# Patient Record
Sex: FEMALE | Race: BLACK OR AFRICAN AMERICAN
[De-identification: names, ages, dates, MRNs, and addresses within clinical notes are randomized per-mention and may not be internally consistent; named-entity substitution may affect disease eponyms.]

---

## 2021-03-29 ENCOUNTER — NURSE TRIAGE (OUTPATIENT)
Dept: OTHER | Facility: CLINIC | Age: 86
End: 2021-03-29

## 2021-03-29 NOTE — TELEPHONE ENCOUNTER
Brief description of triage: middle of neck to right ear to base of head throbbing pain starting about an hour before call. Had second vaccine 3 weeks ago and concerned this might be related. She is not a member of [employer]. Her sister is and gave her this line to call. Triage indicates for patient to PCP today. She will call PCP after our call. Care advice provided, patient verbalizes understanding; denies any other questions or concerns; instructed to call back for any new or worsening symptoms. This triage is a result of a call to [address] [place] [address]. Please do not respond to the triage nurse through this encounter. Any subsequent communication should be directly with the patient. Reason for Disposition   Patient wants to be seen     Triaged up d/t atypical presentation and history of HTN. Answer Assessment - Initial Assessment Questions  1. ONSET: \"When did the pain begin? \"       Pain started about an hour prior to call    2. LOCATION: \"Where does it hurt? \"       See above    3. PATTERN \"Does the pain come and go, or has it been constant since it started? \"       Constant    4. SEVERITY: \"How bad is the pain? \"  (Scale 1-10; or mild, moderate, severe)    - MILD (1-3): doesn't interfere with normal activities     - MODERATE (4-7): interferes with normal activities or awakens from sleep     - SEVERE (8-10):  excruciating pain, unable to do any normal activities       5-6/10    5. RADIATION: \"Does the pain go anywhere else, shoot into your arms? \"      See above    6. CORD SYMPTOMS: \"Any weakness or numbness of the arms or legs? \"      Denies    7. CAUSE: \"What do you think is causing the neck pain? \"      Unsure    8. NECK OVERUSE: [place] recent activities that involved turning or twisting the neck? \"      Denies    9. OTHER SYMPTOMS: \"Do you have any other symptoms? \" (e.g., headache, fever, chest pain, difficulty breathing, neck swelling)      Dizziness comes and goes and pressure in nasal passages. Goes away with flonase. She has not had this neck pain before. She has history of HTN. 10. PREGNANCY: \"Is there any chance you are pregnant? \" \"When was your last menstrual period? \"        NA    Protocols used: NECK PAIN OR STIFFNESS-ADULT-[place]

## 2023-10-06 PROBLEM — J31.0 CHRONIC RHINITIS: Status: ACTIVE | Noted: 2023-10-06

## 2023-10-06 PROBLEM — K22.0 ACHALASIA OF ESOPHAGUS: Status: ACTIVE | Noted: 2023-10-06

## 2023-10-06 PROBLEM — I10 HTN (HYPERTENSION), BENIGN: Status: ACTIVE | Noted: 2023-10-06

## 2023-10-06 PROBLEM — I65.23 BILATERAL CAROTID ARTERY STENOSIS: Status: ACTIVE | Noted: 2023-10-06

## 2023-10-06 PROBLEM — R55 PRE-SYNCOPE: Status: ACTIVE | Noted: 2023-10-06

## 2023-10-06 PROBLEM — R00.1 BRADYCARDIA: Status: ACTIVE | Noted: 2023-10-06

## 2023-10-06 PROBLEM — H83.3X1 ACOUSTIC TRAUMA OF RIGHT EAR: Status: ACTIVE | Noted: 2023-10-06

## 2023-10-06 PROBLEM — H61.21 EXCESSIVE CERUMEN IN EAR CANAL, RIGHT: Status: ACTIVE | Noted: 2023-10-06

## 2023-10-06 PROBLEM — R06.02 SHORTNESS OF BREATH AT REST: Status: ACTIVE | Noted: 2023-10-06

## 2023-10-06 PROBLEM — Z95.0 PACEMAKER: Status: ACTIVE | Noted: 2023-10-06

## 2023-10-06 RX ORDER — NICOTINE POLACRILEX 2 MG
GUM BUCCAL
COMMUNITY

## 2023-10-06 RX ORDER — CHOLECALCIFEROL (VITAMIN D3) 50 MCG
2000 TABLET ORAL DAILY
COMMUNITY

## 2023-10-06 RX ORDER — FLUTICASONE PROPIONATE 50 MCG
SPRAY, SUSPENSION (ML) NASAL
COMMUNITY

## 2023-10-06 RX ORDER — CALCIUM CARB/VITAMIN D3/VIT K1 650MG-12.5
1 TABLET,CHEWABLE ORAL DAILY
COMMUNITY
End: 2023-10-10 | Stop reason: ALTCHOICE

## 2023-10-06 RX ORDER — PANTOPRAZOLE SODIUM 40 MG/1
1 TABLET, DELAYED RELEASE ORAL DAILY
COMMUNITY

## 2023-10-06 RX ORDER — AMLODIPINE BESYLATE 2.5 MG/1
2.5 TABLET ORAL DAILY
COMMUNITY

## 2023-10-06 RX ORDER — ESTRADIOL 0.05 MG/D
FILM, EXTENDED RELEASE TRANSDERMAL
COMMUNITY
End: 2023-10-10 | Stop reason: ALTCHOICE

## 2023-10-06 RX ORDER — ATORVASTATIN CALCIUM 10 MG/1
10 TABLET, FILM COATED ORAL DAILY
COMMUNITY
Start: 2023-04-12 | End: 2023-10-10 | Stop reason: ALTCHOICE

## 2023-10-06 RX ORDER — LUBIPROSTONE 24 UG/1
CAPSULE ORAL
COMMUNITY

## 2023-10-06 RX ORDER — DIAZEPAM 5 MG/1
TABLET ORAL
COMMUNITY

## 2023-10-06 RX ORDER — ESTRADIOL 0.1 MG/G
CREAM VAGINAL
COMMUNITY

## 2023-10-06 ASSESSMENT — ENCOUNTER SYMPTOMS
HEADACHES: 1
LIGHT-HEADEDNESS: 1
DIZZINESS: 1

## 2023-10-06 NOTE — PROGRESS NOTES
Subjective   Patient ID: Bijal Vann is a 88 y.o. female who presents for vertigo and sinus.  HPI  This 88-year-old female is being seen back in the office today for a recheck of her ears.  She has had difficulties with balance function and a previous VNG study was found to be negative for peripheral vestibular dysfunction. Her VNG study previously done revealed a 9% weakness to the right peripheral vestibular system which is in still the range of normal. Up to a 21% difference between the 2 years is felt to still be within a normal range. Hallpike maneuvers, head and body positional changes were all negative for stimulated vertigo with no nystagmus being monitored indicating no active peripheral positional vertigo at this time. She did have difficulties with ocular motor and optokinetic testing which is generally felt to be a central nervous system sign.  She has been referred to neurology for further assessment.  She does have a history of achalasia and has been seen by GI.  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  She has been seen by neurology in the past primarily for headaches and facial pain.  Most recently within the past week she developed dizziness when waking up in the morning and this has continued throughout the week.  She was taking meclizine when this was developing which caused her to feel lightheaded and more dizzy.  The medication did not help.  On 1 morning she did wake up with nasal congestion and a headache.  She has had nasal congestion in the past and an endoscopic examination done to assess that was found to be negative for structural abnormalities.  This been no purulent discharge or signs of infection. Previous CT scan of the head and CTA scan from earlier in the year was negative for acute central nervous system problems and the vascularity especially in the posterior fossa was  intact.n.    Review of Systems   HENT:  Positive for hearing loss.    Neurological:  Positive for dizziness, light-headedness and headaches.     Objective   Physical Exam  Examination:    CONSTITUTIONAL: Alert, in no acute distress, normal pitch/clarity of voice, well-developed, well-nourished, cooperative.  HEAD/FACE: Normocephalic, atraumatic, no tenderness over the sinuses, facial strength and movement symmetric.    SKIN: Good turgor, no rashes, no suspicious lesions, in the head and neck.    EYES: Both eyes have normal extraocular movements with no nystagmus, pupils are equal and reactive to light and accommodation, conjunctiva is clear.    EARS: Both ears are negative for external skin abnormalities, external auditory canals are without lesions or signs of inflammation, tympanic membranes are intact and are of normal color and texture, no effusions are seen, light reflexes normal, no mastoid tenderness is noted to palpation, objective hearing is intact.    NOSE: No external skin lesions are noted, nares are patent, septum is intact and noninflamed, nasal turbinates are normal in appearance, sinuses are nontender to palpation bilaterally, no internal lesions or polyps are noted, no discharge is noted.    OROPHARYNX/ORAL CAVITY: Mucous membranes of the oropharynx and the oral cavity proper are without lesions or ulcerations, tongue mobility is normal and no lesions are noted, gingiva and alveolar mucosa is intact without lesions, oral mucosa is moist, muscular movement of the palate and gag reflex are normal.    NECK: No lymphadenopathy is palpated, neck is supple with full range of motion, thyroid is without swelling or tenderness, trachea is midline, no neck masses are noted.    Lymphatics: No cervical adenopathy or supraclavicular adenopathy noted to palpation.    HEART/VASCULAR: No jugular venous distention is noted, carotid pulsations are intact with a regular rate and rhythm noted,    PULMONARY: Good air  movement with normal inspiratory/expiratory effort is noted, no audible wheezing is appreciated.    NEUROLOGIC: Alert and oriented, cranial nerves are grossly intact, gait is normal, test of skew was normal, extraocular motions were intact with no visible nystagmus, head thrust was unable to be done due to neck stiffness, Romberg was normal sensation in the head and neck is intact,    PSYCH: oriented to person, place and time, normal mood and affect.    EXTREMITIES: No motor dysfunction of the upper and lower extremity is noted.     Assessment/Plan   Problem List Items Addressed This Visit             ICD-10-CM    Chronic rhinitis J31.0    Pacemaker Z95.0    Dizziness and giddiness - Primary R42     Other Visit Diagnoses         Codes    Nasal congestion     R09.81          I discussed the clinical finds with the patient.  From the standpoint of her past examination her VNG study was negative for peripheral vestibular problems and when she was having symptoms of dizziness previously she had been advised to see neurology for further assessment.  She does have a pacemaker in place and at the present time does not appear to be having any cardiovascular issues.  Previous imaging studies were negative for acute central nervous system problems.  I did recommend that she continue with the therapy that has been set up for treatment of low back pain and sciatica although I did give her a referral to that therapist for balance assessment and therapy.  Neck assessment should be done by the therapist to see if this is accountable for any of her symptoms.  She is also advised to follow-up with neurology which is scheduled for the near future.  From a nasal standpoint she should use nasal saline misting during the day and I did suggest she try Nasacort as a topical nasal steroid spray to help reduce any mucous membrane swelling.  Previous CT scan of the head and CTA scan was negative for acute central nervous system problems and  the vascularity especially in the posterior fossa was intact.

## 2023-10-09 PROBLEM — R07.9 CHEST PAIN: Status: ACTIVE | Noted: 2022-09-24
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
## 2023-10-09 PROBLEM — R60.0 LOCALIZED EDEMA: Status: ACTIVE | Noted: 2020-11-23

## 2023-10-09 PROBLEM — M19.90 UNSPECIFIED OSTEOARTHRITIS, UNSPECIFIED SITE: Status: ACTIVE | Noted: 2022-12-16

## 2023-10-09 PROBLEM — I45.9 CONDUCTION DISORDER, UNSPECIFIED: Status: ACTIVE | Noted: 2023-01-13

## 2023-10-09 PROBLEM — M19.031 PRIMARY OSTEOARTHRITIS, RIGHT WRIST: Status: ACTIVE | Noted: 2023-05-31

## 2023-10-09 PROBLEM — M54.50 LOW BACK PAIN, UNSPECIFIED: Status: ACTIVE | Noted: 2023-09-07

## 2023-10-09 PROBLEM — E78.00 ELEVATED CHOLESTEROL: Status: ACTIVE | Noted: 2022-09-24

## 2023-10-09 PROBLEM — I65.23 OCCLUSION AND STENOSIS OF BILATERAL CAROTID ARTERIES: Status: ACTIVE | Noted: 2023-10-04

## 2023-10-09 PROBLEM — K22.0 ACHALASIA OF CARDIA: Status: ACTIVE | Noted: 2023-01-13

## 2023-10-09 PROBLEM — K21.9 GERD WITHOUT ESOPHAGITIS: Status: ACTIVE | Noted: 2023-01-13
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
## 2023-10-09 PROBLEM — R68.84 JAW PAIN: Status: ACTIVE | Noted: 2023-03-14

## 2023-10-09 PROBLEM — E87.5 HYPERKALEMIA: Status: ACTIVE | Noted: 2023-01-13

## 2023-10-09 PROBLEM — E87.1 HYPO-OSMOLALITY AND HYPONATREMIA: Status: ACTIVE | Noted: 2023-01-13

## 2023-10-09 PROBLEM — H43.393 VITREOUS SYNERESIS OF BOTH EYES: Status: ACTIVE | Noted: 2021-02-11

## 2023-10-09 PROBLEM — H35.89 MACULAR RPE MOTTLING: Status: ACTIVE | Noted: 2022-09-20

## 2023-10-09 PROBLEM — F41.9 ANXIETY DISORDER, UNSPECIFIED: Status: ACTIVE | Noted: 2023-01-13

## 2023-10-09 PROBLEM — R39.15 URGENCY OF URINATION: Status: ACTIVE | Noted: 2020-02-21

## 2023-10-09 PROBLEM — H53.10 SUBJECTIVE VISUAL DISTURBANCE: Status: ACTIVE | Noted: 2022-09-20

## 2023-10-09 PROBLEM — N95.2 ATROPHIC VAGINITIS: Status: ACTIVE | Noted: 2019-02-14

## 2023-10-09 PROBLEM — I49.5 SICK SINUS SYNDROME (MULTI): Status: ACTIVE | Noted: 2023-06-19

## 2023-10-09 PROBLEM — Z90.710 H/O ABDOMINAL HYSTERECTOMY: Status: ACTIVE | Noted: 2022-03-07

## 2023-10-09 PROBLEM — R20.2 PARESTHESIA OF SKIN: Status: ACTIVE | Noted: 2023-01-13

## 2023-10-09 PROBLEM — H04.123 DRY EYE SYNDROME OF BILATERAL LACRIMAL GLANDS: Status: ACTIVE | Noted: 2017-03-14

## 2023-10-09 PROBLEM — M81.0 AGE-RELATED OSTEOPOROSIS WITHOUT CURRENT PATHOLOGICAL FRACTURE: Status: ACTIVE | Noted: 2019-03-26

## 2023-10-09 PROBLEM — R20.0 ANESTHESIA OF SKIN: Status: ACTIVE | Noted: 2023-01-13

## 2023-10-09 PROBLEM — N95.1 SYMPTOMATIC MENOPAUSAL OR FEMALE CLIMACTERIC STATES: Status: ACTIVE | Noted: 2018-02-13

## 2023-10-09 PROBLEM — R35.1 NOCTURIA: Status: ACTIVE | Noted: 2020-02-21

## 2023-10-09 PROBLEM — Z96.1 PSEUDOPHAKIA OF BOTH EYES: Status: ACTIVE | Noted: 2021-02-11

## 2023-10-09 RX ORDER — MECLIZINE HCL 12.5 MG 12.5 MG/1
TABLET ORAL
COMMUNITY

## 2023-10-09 RX ORDER — HYOSCYAMINE SULFATE 0.125 MG
TABLET ORAL
COMMUNITY
Start: 2019-10-19 | End: 2023-10-10 | Stop reason: ALTCHOICE

## 2023-10-10 ENCOUNTER — OFFICE VISIT (OUTPATIENT)
Dept: OTOLARYNGOLOGY | Facility: CLINIC | Age: 88
End: 2023-10-10
Payer: MEDICARE

## 2023-10-10 VITALS
HEIGHT: 62 IN | DIASTOLIC BLOOD PRESSURE: 74 MMHG | WEIGHT: 102 LBS | HEART RATE: 72 BPM | BODY MASS INDEX: 18.77 KG/M2 | SYSTOLIC BLOOD PRESSURE: 153 MMHG

## 2023-10-10 DIAGNOSIS — Z95.0 PACEMAKER: ICD-10-CM

## 2023-10-10 DIAGNOSIS — J31.0 CHRONIC RHINITIS: ICD-10-CM

## 2023-10-10 DIAGNOSIS — R42 DIZZINESS AND GIDDINESS: Primary | Chronic | ICD-10-CM

## 2023-10-10 DIAGNOSIS — R09.81 NASAL CONGESTION: ICD-10-CM

## 2023-10-10 PROBLEM — H90.3 SENSORINEURAL HEARING LOSS (SNHL) OF BOTH EARS: Status: ACTIVE | Noted: 2023-10-10

## 2023-10-10 PROCEDURE — 99214 OFFICE O/P EST MOD 30 MIN: CPT | Performed by: OTOLARYNGOLOGY

## 2023-10-10 PROCEDURE — 1160F RVW MEDS BY RX/DR IN RCRD: CPT | Performed by: OTOLARYNGOLOGY

## 2023-10-10 PROCEDURE — 1036F TOBACCO NON-USER: CPT | Performed by: OTOLARYNGOLOGY

## 2023-10-10 PROCEDURE — 1159F MED LIST DOCD IN RCRD: CPT | Performed by: OTOLARYNGOLOGY

## 2023-10-10 PROCEDURE — 3077F SYST BP >= 140 MM HG: CPT | Performed by: OTOLARYNGOLOGY

## 2023-10-10 PROCEDURE — 3078F DIAST BP <80 MM HG: CPT | Performed by: OTOLARYNGOLOGY

## 2023-10-10 RX ORDER — CLOPIDOGREL BISULFATE 75 MG/1
TABLET ORAL
COMMUNITY
Start: 2023-01-04 | End: 2023-10-10 | Stop reason: ALTCHOICE

## 2023-10-10 NOTE — Clinical Note
October 10, 2023       No Recipients    Patient: Bijal Vann   YOB: 1935   Date of Visit: 10/10/2023       Dear Dr. Ken Recipients:    Thank you for referring Bijal Vann to me for evaluation. Below are my notes for this consultation.  If you have questions, please do not hesitate to call me. I look forward to following your patient along with you.       Sincerely,     Nii Moreira DMD, MD      CC:   No Recipients  ______________________________________________________________________________________    Subjective   Patient ID: Bijal Vann is a 88 y.o. female who presents for vertigo and sinus.  HPI  This 88-year-old female is being seen back in the office today for a recheck of her ears.  She has had difficulties with balance function and a previous VNG study was found to be negative for peripheral vestibular dysfunction. Her VNG study previously done revealed a 9% weakness to the right peripheral vestibular system which is in still the range of normal. Up to a 21% difference between the 2 years is felt to still be within a normal range. Hallpike maneuvers, head and body positional changes were all negative for stimulated vertigo with no nystagmus being monitored indicating no active peripheral positional vertigo at this time. She did have difficulties with ocular motor and optokinetic testing which is generally felt to be a central nervous system sign.  She has been referred to neurology for further assessment.  She does have a history of achalasia and has been seen by GI.  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  She has been seen by neurology in the past primarily for headaches and facial pain.  Most recently within the past week she developed dizziness when waking up in the morning and this has continued throughout the week.  She was taking meclizine when this was developing which caused her to  feel lightheaded and more dizzy.  The medication did not help.  On 1 morning she did wake up with nasal congestion and a headache.  She has had nasal congestion in the past and an endoscopic examination done to assess that was found to be negative for structural abnormalities.  This been no purulent discharge or signs of infection. Previous CT scan of the head and CTA scan from earlier in the year was negative for acute central nervous system problems and the vascularity especially in the posterior fossa was intact.n.    Review of Systems   HENT:  Positive for hearing loss.    Neurological:  Positive for dizziness, light-headedness and headaches.     Objective   Physical Exam  Examination:    CONSTITUTIONAL: Alert, in no acute distress, normal pitch/clarity of voice, well-developed, well-nourished, cooperative.  HEAD/FACE: Normocephalic, atraumatic, no tenderness over the sinuses, facial strength and movement symmetric.    SKIN: Good turgor, no rashes, no suspicious lesions, in the head and neck.    EYES: Both eyes have normal extraocular movements with no nystagmus, pupils are equal and reactive to light and accommodation, conjunctiva is clear.    EARS: Both ears are negative for external skin abnormalities, external auditory canals are without lesions or signs of inflammation, tympanic membranes are intact and are of normal color and texture, no effusions are seen, light reflexes normal, no mastoid tenderness is noted to palpation, objective hearing is intact.    NOSE: No external skin lesions are noted, nares are patent, septum is intact and noninflamed, nasal turbinates are normal in appearance, sinuses are nontender to palpation bilaterally, no internal lesions or polyps are noted, no discharge is noted.    OROPHARYNX/ORAL CAVITY: Mucous membranes of the oropharynx and the oral cavity proper are without lesions or ulcerations, tongue mobility is normal and no lesions are noted, gingiva and alveolar mucosa is  intact without lesions, oral mucosa is moist, muscular movement of the palate and gag reflex are normal.    NECK: No lymphadenopathy is palpated, neck is supple with full range of motion, thyroid is without swelling or tenderness, trachea is midline, no neck masses are noted.    Lymphatics: No cervical adenopathy or supraclavicular adenopathy noted to palpation.    HEART/VASCULAR: No jugular venous distention is noted, carotid pulsations are intact with a regular rate and rhythm noted,    PULMONARY: Good air movement with normal inspiratory/expiratory effort is noted, no audible wheezing is appreciated.    NEUROLOGIC: Alert and oriented, cranial nerves are grossly intact, gait is normal, test of skew was normal, extraocular motions were intact with no visible nystagmus, head thrust was unable to be done due to neck stiffness, Romberg was normal sensation in the head and neck is intact,    PSYCH: oriented to person, place and time, normal mood and affect.    EXTREMITIES: No motor dysfunction of the upper and lower extremity is noted.     Assessment/Plan   Problem List Items Addressed This Visit             ICD-10-CM    Chronic rhinitis J31.0    Pacemaker Z95.0    Dizziness and giddiness - Primary R42     Other Visit Diagnoses         Codes    Nasal congestion     R09.81          I discussed the clinical finds with the patient.  From the standpoint of her past examination her VNG study was negative for peripheral vestibular problems and when she was having symptoms of dizziness previously she had been advised to see neurology for further assessment.  She does have a pacemaker in place and at the present time does not appear to be having any cardiovascular issues.  Previous imaging studies were negative for acute central nervous system problems.  I did recommend that she continue with the therapy that has been set up for treatment of low back pain and sciatica although I did give her a referral to that therapist for  balance assessment and therapy.  Neck assessment should be done by the therapist to see if this is accountable for any of her symptoms.  She is also advised to follow-up with neurology which is scheduled for the near future.  From a nasal standpoint she should use nasal saline misting during the day and I did suggest she try Nasacort as a topical nasal steroid spray to help reduce any mucous membrane swelling.  Previous CT scan of the head and CTA scan was negative for acute central nervous system problems and the vascularity especially in the posterior fossa was intact.

## 2023-10-10 NOTE — LETTER
October 10, 2023     Vanessa Sandoval MD  [address]  [facility]  [address]  [facility] [address]    Patient: Bijal Vann   YOB: 1935   Date of Visit: 10/10/2023       Dear Dr. Vanessa Sandoval MD:    Thank you for referring Bijal Vann to me for evaluation. Below are my notes for this consultation.  If you have questions, please do not hesitate to call me. I look forward to following your patient along with you.       Sincerely,     Nii Moreira DMD, MD      CC: No Recipients  ______________________________________________________________________________________    Subjective   Patient ID: Bijal Vann is a 88 y.o. female who presents for vertigo and sinus.  HPI  This 88-year-old female is being seen back in the office today for a recheck of her ears.  She has had difficulties with balance function and a previous VNG study was found to be negative for peripheral vestibular dysfunction. Her VNG study previously done revealed a 9% weakness to the right peripheral vestibular system which is in still the range of normal. Up to a 21% difference between the 2 years is felt to still be within a normal range. Hallpike maneuvers, head and body positional changes were all negative for stimulated vertigo with no nystagmus being monitored indicating no active peripheral positional vertigo at this time. She did have difficulties with ocular motor and optokinetic testing which is generally felt to be a central nervous system sign.  She has been referred to neurology for further assessment.  She does have a history of achalasia and has been seen by GI.  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  She has been seen by neurology in the past primarily for headaches and facial pain.  Most recently within the past week she developed dizziness when waking up in the morning and this has  continued throughout the week.  She was taking meclizine when this was developing which caused her to feel lightheaded and more dizzy.  The medication did not help.  On 1 morning she did wake up with nasal congestion and a headache.  She has had nasal congestion in the past and an endoscopic examination done to assess that was found to be negative for structural abnormalities.  This been no purulent discharge or signs of infection. Previous CT scan of the head and CTA scan from earlier in the year was negative for acute central nervous system problems and the vascularity especially in the posterior fossa was intact.n.    Review of Systems   HENT:  Positive for hearing loss.    Neurological:  Positive for dizziness, light-headedness and headaches.     Objective   Physical Exam  Examination:    CONSTITUTIONAL: Alert, in no acute distress, normal pitch/clarity of voice, well-developed, well-nourished, cooperative.  HEAD/FACE: Normocephalic, atraumatic, no tenderness over the sinuses, facial strength and movement symmetric.    SKIN: Good turgor, no rashes, no suspicious lesions, in the head and neck.    EYES: Both eyes have normal extraocular movements with no nystagmus, pupils are equal and reactive to light and accommodation, conjunctiva is clear.    EARS: Both ears are negative for external skin abnormalities, external auditory canals are without lesions or signs of inflammation, tympanic membranes are intact and are of normal color and texture, no effusions are seen, light reflexes normal, no mastoid tenderness is noted to palpation, objective hearing is intact.    NOSE: No external skin lesions are noted, nares are patent, septum is intact and noninflamed, nasal turbinates are normal in appearance, sinuses are nontender to palpation bilaterally, no internal lesions or polyps are noted, no discharge is noted.    OROPHARYNX/ORAL CAVITY: Mucous membranes of the oropharynx and the oral cavity proper are without lesions  or ulcerations, tongue mobility is normal and no lesions are noted, gingiva and alveolar mucosa is intact without lesions, oral mucosa is moist, muscular movement of the palate and gag reflex are normal.    NECK: No lymphadenopathy is palpated, neck is supple with full range of motion, thyroid is without swelling or tenderness, trachea is midline, no neck masses are noted.    Lymphatics: No cervical adenopathy or supraclavicular adenopathy noted to palpation.    HEART/VASCULAR: No jugular venous distention is noted, carotid pulsations are intact with a regular rate and rhythm noted,    PULMONARY: Good air movement with normal inspiratory/expiratory effort is noted, no audible wheezing is appreciated.    NEUROLOGIC: Alert and oriented, cranial nerves are grossly intact, gait is normal, test of skew was normal, extraocular motions were intact with no visible nystagmus, head thrust was unable to be done due to neck stiffness, Romberg was normal sensation in the head and neck is intact,    PSYCH: oriented to person, place and time, normal mood and affect.    EXTREMITIES: No motor dysfunction of the upper and lower extremity is noted.     Assessment/Plan   Problem List Items Addressed This Visit             ICD-10-CM    Chronic rhinitis J31.0    Pacemaker Z95.0    Dizziness and giddiness - Primary R42     Other Visit Diagnoses         Codes    Nasal congestion     R09.81          I discussed the clinical finds with the patient.  From the standpoint of her past examination her VNG study was negative for peripheral vestibular problems and when she was having symptoms of dizziness previously she had been advised to see neurology for further assessment.  She does have a pacemaker in place and at the present time does not appear to be having any cardiovascular issues.  Previous imaging studies were negative for acute central nervous system problems.  I did recommend that she continue with the therapy that has been set up for  treatment of low back pain and sciatica although I did give her a referral to that therapist for balance assessment and therapy.  Neck assessment should be done by the therapist to see if this is accountable for any of her symptoms.  She is also advised to follow-up with neurology which is scheduled for the near future.  From a nasal standpoint she should use nasal saline misting during the day and I did suggest she try Nasacort as a topical nasal steroid spray to help reduce any mucous membrane swelling.  Previous CT scan of the head and CTA scan was negative for acute central nervous system problems and the vascularity especially in the posterior fossa was intact.

## 2023-12-13 DIAGNOSIS — Z95.0 PACEMAKER: Primary | ICD-10-CM

## 2023-12-13 DIAGNOSIS — R00.1 BRADYCARDIA: ICD-10-CM

## 2023-12-18 ENCOUNTER — HOSPITAL ENCOUNTER (OUTPATIENT)
Dept: CARDIOLOGY | Facility: CLINIC | Age: 88
Discharge: HOME | End: 2023-12-18
Payer: MEDICARE

## 2023-12-18 DIAGNOSIS — I49.5 SSS (SICK SINUS SYNDROME) (MULTI): ICD-10-CM

## 2023-12-18 PROCEDURE — 93279 PRGRMG DEV EVAL PM/LDLS PM: CPT

## 2024-01-26 PROBLEM — R09.81 NASAL CONGESTION: Status: ACTIVE | Noted: 2024-01-26

## 2024-01-26 PROBLEM — R42 LIGHTHEADEDNESS: Status: ACTIVE | Noted: 2023-10-10

## 2024-01-26 PROBLEM — Z86.39 HISTORY OF METABOLIC DISORDER: Status: ACTIVE | Noted: 2024-01-26

## 2024-01-26 PROBLEM — R20.0 NUMBNESS OF FACE: Status: ACTIVE | Noted: 2024-01-26

## 2024-01-26 RX ORDER — ACETAMINOPHEN 325 MG/1
TABLET ORAL EVERY 4 HOURS
COMMUNITY

## 2024-01-26 RX ORDER — CALCITONIN SALMON 200 [IU]/.09ML
SPRAY, METERED NASAL EVERY 24 HOURS
COMMUNITY
Start: 2023-12-15

## 2024-01-26 RX ORDER — ALENDRONATE SODIUM 70 MG/1
TABLET ORAL
COMMUNITY
Start: 2023-06-23

## 2024-01-26 RX ORDER — CALCITONIN SALMON 200 [USP'U]/ML
INJECTION, SOLUTION INTRAMUSCULAR; SUBCUTANEOUS EVERY 24 HOURS
COMMUNITY
Start: 2023-12-13

## 2024-02-03 NOTE — PROGRESS NOTES
Subjective   Patient ID: Bijal Vann is a 88 y.o. female who presents for No chief complaint on file..  HPI  This 88-year-old female is being seen back in the office for recheck of her head and neck and ears primarily.  He was previously assessed for balance difficulties and was found to be negative for peripheral vestibular dysfunction.  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  She has been seen by neurology for headaches and facial pain.  She does take Antivert at times for dizziness and finds it at times helpful.  CT scans of the head and CTA scans done in 2023 was negative for any vascular problems in the posterior fossa.  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  He has seen physical therapist for low back pain and sciatica in the past.  Review of Systems  A 12 point ROS has been reviewed and are negative for complaint except what is stated in the history of present illness and/or past medical history as noted in the EMR  Objective   Physical Exam  EXAMINATION:    GENERAL EBONY.EARANCE: Alert, in no acute distress, normal pitch and clarity of voice, well-developed and nourished, cooperative.    HEAD/FACE: Normocephalic, atraumatic, normal facial movements and strength, no no tenderness to palpation, no lesions noted.    SKIN: Normal turgor, no raised or ulcerative lesions, warm and dry to palpation.    EYES: Extraocular motions intact, no nystagmus noted, pupils equal and reactive to light and accommodation, no conjunctivitis.    EARS: Both ears--external ear anatomy is normal without lesions, auditory canals are patent and without skin abrasions or lesions, hearing is intact to voice, tympanic membranes are intact with no acute inflammation, light reflexes present, no effusions are noted and no mastoid tenderness found to palpation.    NOSE: No external skin lesions are noted, nares are patent, septum is  intact, sinuses are nontender to palpation bilaterally, no intranasal lesions or inflammation is noted, nasal valve is normal.    OROPHARYNX/ORAL CAVITY: Oropharynx is not inflamed and is without lesions, mucosa of the oral cavity is intact and without lesions, tongue is midline and mobile, no acute dental disease is noted, TMJs are mobile    LUNG-- NO wheezing or rhonchi normal respiratory effort    HEART-- No venous congestion,  rate and rhythm regular,    NECK: No lymphadenopathy is palpated, carotid pulses are intact, neck is supple with full range of motion, no thyroid abnormalities are noted, trachea is midline, no neck masses are palpated.    LYMPHATICS: No cervical adenopathy or supraclavicular adenopathy is palpated.    NEUROLOGIC/PSYCH;   Alert and oriented, cranial nerves are grossly intact, gait is normal, test of skew was normal, extraocular motions were intact with no visible nystagmus, head thrust was unable to be done due to neck stiffness, Romberg was normal sensation in the head and neck is intact   Assessment/Plan   {Assess/PlanSmartLinks:53450}         Nii Moreira DMD, MD 02/03/24 11:44 AM

## 2024-02-07 ENCOUNTER — APPOINTMENT (OUTPATIENT)
Dept: OTOLARYNGOLOGY | Facility: CLINIC | Age: 89
End: 2024-02-07
Payer: MEDICARE

## 2024-02-15 ENCOUNTER — APPOINTMENT (OUTPATIENT)
Dept: OTOLARYNGOLOGY | Facility: CLINIC | Age: 89
End: 2024-02-15
Payer: MEDICARE

## 2024-02-15 ENCOUNTER — APPOINTMENT (OUTPATIENT)
Dept: AUDIOLOGY | Facility: CLINIC | Age: 89
End: 2024-02-15
Payer: MEDICARE

## 2024-06-17 ENCOUNTER — HOSPITAL ENCOUNTER (OUTPATIENT)
Dept: CARDIOLOGY | Facility: CLINIC | Age: 89
Discharge: HOME | End: 2024-06-17
Payer: MEDICARE

## 2024-06-17 DIAGNOSIS — Z95.0 PRESENCE OF CARDIAC PACEMAKER: ICD-10-CM

## 2024-06-17 DIAGNOSIS — I49.5 SICK SINUS SYNDROME (MULTI): ICD-10-CM

## 2024-06-17 PROCEDURE — 93296 REM INTERROG EVL PM/IDS: CPT

## 2024-06-24 ENCOUNTER — HOSPITAL ENCOUNTER (OUTPATIENT)
Dept: CARDIOLOGY | Facility: CLINIC | Age: 89
Discharge: HOME | End: 2024-06-24
Payer: MEDICARE

## 2024-06-24 DIAGNOSIS — I49.5 SICK SINUS SYNDROME (MULTI): ICD-10-CM

## 2024-06-24 DIAGNOSIS — Z95.0 PRESENCE OF CARDIAC PACEMAKER: ICD-10-CM

## 2024-08-16 NOTE — PROGRESS NOTES
Subjective   Patient ID: Bijal Vann is a 88 y.o. female who presents for No chief complaint on file..  HPI  This 88-year-old female is being seen back in the office in follow-up.  She was last seen in October.  She did have a peripheral vestibular workup done due to her instability issues which was basically negative.  There was some positive findings with ocular motor and optokinetic testing and she was referred to neurology for further assessment of her balance.  She does have a history of sensorineural hearing loss as well as a history of headaches and facial pain.  In 2023 she underwent a CT scan and CTA scan of the head which was negative for vascular problems especially in the posterior fossa.  Saline nasal rinses and misting along with a topical steroid eel spray had been recommended for nasal care.  Review of Systems  A 12 point ROS has been reviewed and are negative for complaint except what is stated in the history of present illness and/or past medical history as noted in the EMR    Past Medical History:   Diagnosis Date    Personal history of other drug therapy     COVID-19 vaccine series completed    Personal history of other endocrine, nutritional and metabolic disease     History of high cholesterol          Current Outpatient Medications:     acetaminophen (Tylenol) 325 mg tablet, every 4 hours., Disp: , Rfl:     alendronate (Fosamax) 70 mg tablet, 1 tablet 30 minutes before the first food, beverage or medicine of the day with plain water Orally once a week for 90 days, Disp: , Rfl:     amLODIPine (Norvasc) 2.5 mg tablet, Take 1 tablet (2.5 mg) by mouth once daily., Disp: , Rfl:     ASPIRIN ORAL, Take by mouth., Disp: , Rfl:     BACILLUS COAGULANS-INULIN ORAL, Take by mouth., Disp: , Rfl:     biotin 1 mg capsule, Take by mouth., Disp: , Rfl:     calcitonin (Miacalcin) 200 unit/mL injection, once every 24 hours., Disp: , Rfl:     calcitonin, salmon, (Miacalcin) 200 unit/actuation nasal spray, once  every 24 hours., Disp: , Rfl:     cholecalciferol (Vitamin D-3) 50 MCG (2000 UT) tablet, Take 1 tablet (2,000 Units) by mouth once daily., Disp: , Rfl:     diazePAM (Valium) 5 mg tablet, Take by mouth., Disp: , Rfl:     Estrace 0.01 % (0.1 mg/gram) vaginal cream, Insert into the vagina., Disp: , Rfl:     fluticasone (Flonase Allergy Relief) 50 mcg/actuation nasal spray, Administer into affected nostril(s)., Disp: , Rfl:     lubiprostone (Amitiza) 24 mcg capsule, Take by mouth., Disp: , Rfl:     meclizine (Antivert) 12.5 mg tablet, Take by mouth., Disp: , Rfl:     pantoprazole (ProtoNix) 40 mg EC tablet, Take 1 tablet (40 mg) by mouth once daily., Disp: , Rfl:      Allergies   Allergen Reactions    Denosumab Unknown    Estradiol Other     Dizziness, flu like symptoms, depression    Losartan Other    Penicillins Unknown and Other     Patient uncertain - received from previous medical offices    Adhesive Other, Rash and Unknown    Adhesive Tape-Silicones Rash       There were no vitals taken for this visit.    Objective   Physical Exam  EXAMINATION:    GENERAL EBONY.EARANCE: Alert, in no acute distress, normal pitch and clarity of voice, well-developed and nourished, cooperative.    HEAD/FACE: Normocephalic, atraumatic, normal facial movements and strength, no no tenderness to palpation, no lesions noted.    SKIN: Normal turgor, no raised or ulcerative lesions, warm and dry to palpation.    EYES: Extraocular motions intact, no nystagmus noted, pupils equal and reactive to light and accommodation, no conjunctivitis.    EARS: Both ears--external ear anatomy is normal without lesions, auditory canals are patent and without skin abrasions or lesions, hearing is intact to voice, tympanic membranes are intact with no acute inflammation, light reflexes present, no effusions are noted and no mastoid tenderness found to palpation.    NOSE: No external skin lesions are noted, nares are patent, septum is intact, sinuses are  nontender to palpation bilaterally, no intranasal lesions or inflammation is noted, nasal valve is normal.    OROPHARYNX/ORAL CAVITY: Oropharynx is not inflamed and is without lesions, mucosa of the oral cavity is intact and without lesions, tongue is midline and mobile, no acute dental disease is noted, TMJs are mobile    LUNG-- NO wheezing or rhonchi normal respiratory effort    HEART-- No venous congestion,  rate and rhythm regular,    NECK: No lymphadenopathy is palpated, carotid pulses are intact, neck is supple with full range of motion, no thyroid abnormalities are noted, trachea is midline, no neck masses are palpated.    LYMPHATICS: No cervical adenopathy or supraclavicular adenopathy is palpated.    NEUROLOGIC/PSYCH; alert and oriented, cranial nerves are grossly intact, gait is without falling, no motor deficits are noted.  Assessment/Plan   {Assess/PlanSmartLinks:44218}         Nii Moreira DMD, MD 08/16/24 11:23 AM

## 2024-08-21 ENCOUNTER — APPOINTMENT (OUTPATIENT)
Dept: OTOLARYNGOLOGY | Facility: CLINIC | Age: 89
End: 2024-08-21
Payer: MEDICARE

## 2024-08-21 ENCOUNTER — APPOINTMENT (OUTPATIENT)
Dept: AUDIOLOGY | Facility: CLINIC | Age: 89
End: 2024-08-21
Payer: MEDICARE

## 2024-08-21 DIAGNOSIS — R09.81 NASAL CONGESTION: ICD-10-CM

## 2024-08-21 DIAGNOSIS — R42 DIZZINESS AND GIDDINESS: ICD-10-CM

## 2024-08-21 DIAGNOSIS — Z95.0 PACEMAKER: ICD-10-CM

## 2024-08-21 DIAGNOSIS — H90.3 SENSORINEURAL HEARING LOSS (SNHL) OF BOTH EARS: Primary | ICD-10-CM

## 2024-12-02 NOTE — PROGRESS NOTES
Subjective   Patient ID: Bijal Vann is a 89 y.o. female who presents for No chief complaint on file..  HPI  This 89-year-old female last seen in the office in October 2023 is being seen in follow-up.  She does have a history of nonvertiginous dizziness and a previous VNG study revealed no evidence for peripheral vestibular dysfunction.  Her VNG study did show evidence for ocular motor and optokinetic abnormalities.  She was referred to neurology for further assessment.  She has seen neurology in the past for headaches and facial pain issues.  Previous evaluations on nasal congestion were done by nasal endoscopy showing no structural abnormalities or findings of infection.  She has been encouraged to use nasal saline misting during the day along with Nasacort as a topical steroid eel spray.  When last seen she was asked to follow-up with neurology in regards to her dizziness and headaches.  She does have a history of bilateral sensorineural hearing loss.  Had difficulties with thoracic spine fractures and has been involved with physical therapy for a number of months.  She did not follow-up with neurology as previously advised.  She states that i she was evaluated with MRI scans and CT scans when at [facility] prior to her visit in January 2023 for the same issues.  Review of Systems   A 12 point ROS  has been reviewed and are negative for complaint except for what is stated in the history of present illness and /or for past medical history as noted in the EMR.    Past Medical History:   Diagnosis Date    Personal history of other drug therapy     COVID-19 vaccine series completed    Personal history of other endocrine, nutritional and metabolic disease     History of high cholesterol        Ms.    Allergies   Allergen Reactions    Denosumab Unknown    Estradiol Other     Dizziness, flu like symptoms, depression    Losartan Other    Penicillins Unknown and Other     Patient uncertain - received from  previous medical offices    Adhesive Other, Rash and Unknown    Adhesive Tape-Silicones Rash       There were no vitals taken for this visit.    Objective   Physical Exam  Examination:     CONSTITUTIONAL: Alert, in no acute distress, normal pitch/clarity of voice, well-developed, well-nourished, cooperative.  HEAD/FACE: Normocephalic, atraumatic, no tenderness over the sinuses, facial strength and movement symmetric.     SKIN: Good turgor, no rashes, no suspicious lesions, in the head and neck.     EYES: Both eyes have normal extraocular movements with no nystagmus, pupils are equal and reactive to light and accommodation, conjunctiva is clear.     EARS: Both ears are negative for external skin abnormalities, external auditory canals are without lesions or signs of inflammation, tympanic membranes are intact and are of normal color and texture, no effusions are seen, light reflexes normal, no mastoid tenderness is noted to palpation, objective hearing is intact.     NOSE: No external skin lesions are noted, nares are patent, septum is intact and noninflamed, nasal turbinates are normal in appearance, sinuses are nontender to palpation bilaterally, no internal lesions or polyps are noted, no discharge is noted.     OROPHARYNX/ORAL CAVITY: Mucous membranes of the oropharynx and the oral cavity proper are without lesions or ulcerations, tongue mobility is normal and no lesions are noted, gingiva and alveolar mucosa is intact without lesions, oral mucosa is moist, muscular movement of the palate and gag reflex are normal.     NECK: No lymphadenopathy is palpated, neck is supple with full range of motion, thyroid is without swelling or tenderness, trachea is midline, no neck masses are noted.     Lymphatics: No cervical adenopathy or supraclavicular adenopathy noted to palpation.     HEART/VASCULAR: No jugular venous distention is noted, carotid pulsations are intact with a regular rate and rhythm noted,     PULMONARY:  Good air movement with normal inspiratory/expiratory effort is noted, no audible wheezing is appreciated.     NEUROLOGIC: Alert and oriented, cranial nerves are grossly intact, gait is normal, test of skew was normal, extraocular motions were intact with no visible nystagmus, head thrust was unable to be done due to neck stiffness, Romberg was normal sensation in the head and neck is intact,     PSYCH: oriented to person, place and time, normal mood and affect.     EXTREMITIES: No motor dysfunction of the upper and lower extremity is noted.      Assessment/Plan   Problem List Items Addressed This Visit             ICD-10-CM    Bilateral carotid artery stenosis I65.23    Chronic rhinitis - Primary J31.0    Pacemaker Z95.0    Dizziness and giddiness R42    Nasal congestion R09.81        I discussed the clinical finds with the patient.  Her exam today was negative for any signs of acute inflammation or infection in the upper airway.  I encouraged her to use a nasal saline wash using the [employer] system with purified water.  We reviewed the techniques to do that.  I also felt she should be on a topical steroid eel spray and I showed her the appropriate way to apply that to help reduce nasal turbinate inflammation causing exacerbation of the nasal cycle.  She was advised again to see neurology for evaluation of her dizziness.  This could be related to her cardiovascular issues since she does have some atherosclerotic changes as well as has a pacemaker in place.  The previous VNG studies that were done to assess her complaint of dizziness were negative for peripheral issues.  I again encouraged her to follow through with that advice.  Recheck here in the office in 2 to 3 months was advised to see if there is any improvement from the standpoint of nasal congestion.  Imaging studies might need to be redone if her symptoms seem to be increasing.  The images from [facility] not available to review but the patient  relates that they were normal.    Nii Moreira DMD, MD 12/02/24 1:18 PM

## 2024-12-03 ENCOUNTER — OFFICE VISIT (OUTPATIENT)
Dept: OTOLARYNGOLOGY | Facility: CLINIC | Age: 89
End: 2024-12-03
Payer: MEDICARE

## 2024-12-03 DIAGNOSIS — R42 DIZZINESS AND GIDDINESS: ICD-10-CM

## 2024-12-03 DIAGNOSIS — I65.23 BILATERAL CAROTID ARTERY STENOSIS: ICD-10-CM

## 2024-12-03 DIAGNOSIS — Z95.0 PACEMAKER: ICD-10-CM

## 2024-12-03 DIAGNOSIS — J31.0 CHRONIC RHINITIS: Primary | ICD-10-CM

## 2024-12-03 DIAGNOSIS — R09.81 NASAL CONGESTION: ICD-10-CM

## 2024-12-03 PROCEDURE — 99213 OFFICE O/P EST LOW 20 MIN: CPT | Performed by: OTOLARYNGOLOGY

## 2024-12-03 PROCEDURE — 1160F RVW MEDS BY RX/DR IN RCRD: CPT | Performed by: OTOLARYNGOLOGY

## 2024-12-03 PROCEDURE — 1036F TOBACCO NON-USER: CPT | Performed by: OTOLARYNGOLOGY

## 2024-12-03 PROCEDURE — 1159F MED LIST DOCD IN RCRD: CPT | Performed by: OTOLARYNGOLOGY

## 2024-12-06 ENCOUNTER — HOSPITAL ENCOUNTER (OUTPATIENT)
Dept: CARDIOLOGY | Facility: CLINIC | Age: 89
Discharge: HOME | End: 2024-12-06
Payer: MEDICARE

## 2024-12-06 DIAGNOSIS — Z95.0 PACEMAKER: ICD-10-CM

## 2024-12-06 DIAGNOSIS — R00.1 BRADYCARDIA: ICD-10-CM

## 2024-12-06 PROCEDURE — 93280 PM DEVICE PROGR EVAL DUAL: CPT | Performed by: INTERNAL MEDICINE

## 2024-12-06 PROCEDURE — 93280 PM DEVICE PROGR EVAL DUAL: CPT

## 2025-02-27 NOTE — PROGRESS NOTES
Subjective   Patient ID: Bijal Vann is a 89 y.o. female who presents for No chief complaint on file..  HPI  This 89-year-old female last seen in the office in December 2024 is being seen in follow-up.  She has a history of nonvertiginous dizziness with previous VNG studies showing no peripheral vestibular abnormality.  Neurology follow-up had been recommended for further evaluation if necessary into balance.  From a nasal standpoint she has had some congestive issues in the past complained of facial pain and headaches.  Previous visits where endoscopy was done were negative for any structural abnormalities in the nasal cavity.  Nasal saline misting along with topical Nasacort spray have been advised.  She was hospitalized according to her history in 2023 for imbalance and she states that imaging studies have been done no reports are able to be retrieved.  She comes in today with similar symptoms with periodic facial pain and pressure over the frontal and temporal area.  There is also been some varying degrees of nasal congestion with rhinitis symptoms.  She has not been acutely ill.  She has continued to use a nasal saline wash and has used topical steroid sprays.  She has had no troubles with swallowing or with voice and she still feels that she is hearing adequately.  She is status post pacemaker placement  Review of Systems   A 12 point ROS  has been reviewed and are negative for complaint except for what is stated in the history of present illness and /or for past medical history as noted in the EMR.    Past Medical History:   Diagnosis Date    Personal history of other drug therapy     COVID-19 vaccine series completed    Personal history of other endocrine, nutritional and metabolic disease     History of high cholesterol          Current Outpatient Medications:     bisacodyl (Dulcolax) 5 mg EC tablet, Take 1 tablet (5 mg) by mouth early in the morning.., Disp: , Rfl:     lifitegrast (Xiidra) 5 %  dropperette, Administer 1 drop into affected eye(s) twice a day., Disp: , Rfl:     meclizine (Antivert) 25 mg tablet, Take 1 tablet (25 mg) by mouth every 8 hours if needed for dizziness., Disp: , Rfl:     naproxen (Naprosyn) 500 mg tablet, Take 1 tablet (500 mg) by mouth if needed., Disp: , Rfl:     oxymetazoline, PF, (Upneeq, PF,) 0.1 % dropperette, Administer 1 drop into affected eye(s) once daily., Disp: , Rfl:     Tymlos 80 mcg (3,120 mcg/1.56 mL) pen injector, , Disp: , Rfl:     acetaminophen (Tylenol) 325 mg tablet, every 4 hours., Disp: , Rfl:     alendronate (Fosamax) 70 mg tablet, 1 tablet 30 minutes before the first food, beverage or medicine of the day with plain water Orally once a week for 90 days, Disp: , Rfl:     amLODIPine (Norvasc) 2.5 mg tablet, Take 1 tablet (2.5 mg) by mouth once daily., Disp: , Rfl:     ASPIRIN ORAL, Take by mouth., Disp: , Rfl:     BACILLUS COAGULANS-INULIN ORAL, Take by mouth., Disp: , Rfl:     biotin 1 mg capsule, Take by mouth., Disp: , Rfl:     calcitonin (Miacalcin) 200 unit/mL injection, once every 24 hours., Disp: , Rfl:     calcitonin, salmon, (Miacalcin) 200 unit/actuation nasal spray, once every 24 hours., Disp: , Rfl:     calcium carb/vitamin D3/vit K1 (CALCIUM-VITAMIN D3-VITAMIN K ORAL), Take by mouth., Disp: , Rfl:     cholecalciferol (Vitamin D-3) 50 MCG (2000 UT) tablet, Take 1 tablet (2,000 Units) by mouth once daily., Disp: , Rfl:     diazePAM (Valium) 5 mg tablet, Take by mouth., Disp: , Rfl:     Estrace 0.01 % (0.1 mg/gram) vaginal cream, Insert into the vagina., Disp: , Rfl:     fluticasone (Flonase Allergy Relief) 50 mcg/actuation nasal spray, Administer into affected nostril(s)., Disp: , Rfl:     lubiprostone (Amitiza) 24 mcg capsule, Take by mouth., Disp: , Rfl:     pantoprazole (ProtoNix) 40 mg EC tablet, Take 1 tablet (40 mg) by mouth once daily., Disp: , Rfl:      Allergies   Allergen Reactions    Denosumab Unknown    Estradiol Other     Dizziness,  flu like symptoms, depression    Losartan Other    Penicillins Unknown and Other     Patient uncertain - received from previous medical offices    Adhesive Other, Rash and Unknown    Adhesive Tape-Silicones Rash   Blood pressure 167/76, pulse 76, weight 46.3 kg (102 lb).    Objective   Physical Exam  Examination:     CONSTITUTIONAL: Alert, in no acute distress, normal pitch/clarity of voice, well-developed, well-nourished, cooperative.  HEAD/FACE: Normocephalic, atraumatic, no tenderness over the sinuses, facial strength and movement symmetric.     SKIN: Good turgor, no rashes, no suspicious lesions, in the head and neck.     EYES: Both eyes have normal extraocular movements with no nystagmus, pupils are equal and reactive to light and accommodation, conjunctiva is clear.     EARS: Both ears are negative for external skin abnormalities, external auditory canals are without lesions or signs of inflammation, tympanic membranes are intact and are of normal color and texture, no effusions are seen, light reflexes normal, no mastoid tenderness is noted to palpation, objective hearing is intact.     NOSE: No external skin lesions are noted, nares are patent, septum is intact and noninflamed, nasal turbinates are normal in appearance, sinuses are nontender to palpation bilaterally, no internal lesions or polyps are noted, no discharge is noted.     OROPHARYNX/ORAL CAVITY: Mucous membranes of the oropharynx and the oral cavity proper are without lesions or ulcerations, tongue mobility is normal and no lesions are noted, gingiva and alveolar mucosa is intact without lesions, oral mucosa is moist, muscular movement of the palate and gag reflex are normal.     NECK: No lymphadenopathy is palpated, neck is supple with full range of motion, thyroid is without swelling or tenderness, trachea is midline, no neck masses are noted.     Lymphatics: No cervical adenopathy or supraclavicular adenopathy noted to palpation.      HEART/VASCULAR: No jugular venous distention is noted, carotid pulsations are intact with a regular rate and rhythm noted,     PULMONARY: Good air movement with normal inspiratory/expiratory effort is noted, no audible wheezing is appreciated.     NEUROLOGIC: Alert and oriented, cranial nerves are grossly intact, gait is normal, test of skew was normal, extraocular motions were intact with no visible nystagmus, head thrust was unable to be done due to neck stiffness, Romberg was normal sensation in the head and neck is intact,     PSYCH: oriented to person, place and time, normal mood and affect.     EXTREMITIES: No motor dysfunction of the upper and lower extremity is noted.   Assessment/Plan   Problem List Items Addressed This Visit             ICD-10-CM    Bilateral carotid artery stenosis I65.23    Chronic rhinitis - Primary J31.0    Pacemaker Z95.0    Dizziness and giddiness R42     Other Visit Diagnoses         Codes    Facial pressure     R44.8             I discussed the clinical finds with the patient.  Her exam today was negative for any signs of acute inflammation of mucous membrane surfaces.  There did not appear to be any significant degree of nasal congestion and no purulent discharge was noted.  There was no pain to palpation or pressure over the sinus bearing areas.  There is no evidence for conjunctivitis and the patient denies any swallowing or voice problems.  I encouraged her to continue with nasal saline washes and the Nasacort spray in conjunction when necessary an antihistamine might be of benefit.  If he has any acute increasing symptoms then imaging studies would be recommended and she is to contact the office if that were to occur.  I did remind her that she does have a mid to high-frequency hearing loss last checked in January 2023.  If she wishes to have that reassessed then she can make arrangements through the office for that purpose.    Nii Moreira DMD, MD 03/03/25 2:09 PM

## 2025-03-03 ENCOUNTER — APPOINTMENT (OUTPATIENT)
Dept: OTOLARYNGOLOGY | Facility: CLINIC | Age: 89
End: 2025-03-03
Payer: MEDICARE

## 2025-03-03 VITALS
SYSTOLIC BLOOD PRESSURE: 167 MMHG | BODY MASS INDEX: 18.66 KG/M2 | HEART RATE: 76 BPM | WEIGHT: 102 LBS | DIASTOLIC BLOOD PRESSURE: 76 MMHG

## 2025-03-03 DIAGNOSIS — R44.8 FACIAL PRESSURE: ICD-10-CM

## 2025-03-03 DIAGNOSIS — I65.23 BILATERAL CAROTID ARTERY STENOSIS: ICD-10-CM

## 2025-03-03 DIAGNOSIS — R42 DIZZINESS AND GIDDINESS: ICD-10-CM

## 2025-03-03 DIAGNOSIS — Z95.0 PACEMAKER: ICD-10-CM

## 2025-03-03 DIAGNOSIS — J31.0 CHRONIC RHINITIS: Primary | ICD-10-CM

## 2025-03-03 PROCEDURE — 1160F RVW MEDS BY RX/DR IN RCRD: CPT | Performed by: OTOLARYNGOLOGY

## 2025-03-03 PROCEDURE — 3077F SYST BP >= 140 MM HG: CPT | Performed by: OTOLARYNGOLOGY

## 2025-03-03 PROCEDURE — 3078F DIAST BP <80 MM HG: CPT | Performed by: OTOLARYNGOLOGY

## 2025-03-03 PROCEDURE — 99213 OFFICE O/P EST LOW 20 MIN: CPT | Performed by: OTOLARYNGOLOGY

## 2025-03-03 PROCEDURE — 1159F MED LIST DOCD IN RCRD: CPT | Performed by: OTOLARYNGOLOGY

## 2025-03-03 PROCEDURE — 1036F TOBACCO NON-USER: CPT | Performed by: OTOLARYNGOLOGY

## 2025-03-03 RX ORDER — OXYMETAZOLINE HYDROCHLORIDE OPHTHALMIC 1 MG/ML
1 SOLUTION/ DROPS OPHTHALMIC
COMMUNITY
Start: 2024-04-04

## 2025-03-03 RX ORDER — MECLIZINE HYDROCHLORIDE 25 MG/1
1 TABLET ORAL EVERY 8 HOURS PRN
COMMUNITY
Start: 2024-09-11

## 2025-03-03 RX ORDER — LIFITEGRAST 50 MG/ML
1 SOLUTION/ DROPS OPHTHALMIC 2 TIMES DAILY
COMMUNITY
Start: 2024-08-16

## 2025-03-03 RX ORDER — NAPROXEN 500 MG/1
500 TABLET ORAL AS NEEDED
COMMUNITY
Start: 2025-01-27

## 2025-03-03 RX ORDER — ABALOPARATIDE 2000 UG/ML
INJECTION, SOLUTION SUBCUTANEOUS
COMMUNITY
Start: 2024-06-21

## 2025-03-03 RX ORDER — BISACODYL 5 MG
1 TABLET, DELAYED RELEASE (ENTERIC COATED) ORAL
COMMUNITY
Start: 2024-12-10